# Patient Record
Sex: MALE | Race: WHITE | Employment: FULL TIME | URBAN - METROPOLITAN AREA
[De-identification: names, ages, dates, MRNs, and addresses within clinical notes are randomized per-mention and may not be internally consistent; named-entity substitution may affect disease eponyms.]

---

## 2024-08-02 ENCOUNTER — HOSPITAL ENCOUNTER (OUTPATIENT)
Dept: RADIOLOGY | Facility: EXTERNAL LOCATION | Age: 24
Discharge: HOME | End: 2024-08-02

## 2024-08-15 ENCOUNTER — APPOINTMENT (OUTPATIENT)
Dept: ORTHOPEDIC SURGERY | Facility: CLINIC | Age: 24
End: 2024-08-15
Payer: COMMERCIAL

## 2024-08-21 ENCOUNTER — LAB (OUTPATIENT)
Dept: LAB | Facility: LAB | Age: 24
End: 2024-08-21
Payer: COMMERCIAL

## 2024-08-21 ENCOUNTER — OFFICE VISIT (OUTPATIENT)
Dept: ORTHOPEDIC SURGERY | Facility: CLINIC | Age: 24
End: 2024-08-21
Payer: COMMERCIAL

## 2024-08-21 ENCOUNTER — HOSPITAL ENCOUNTER (OUTPATIENT)
Dept: RADIOLOGY | Facility: EXTERNAL LOCATION | Age: 24
Discharge: HOME | End: 2024-08-21

## 2024-08-21 VITALS — WEIGHT: 220 LBS | HEIGHT: 71 IN | BODY MASS INDEX: 30.8 KG/M2

## 2024-08-21 DIAGNOSIS — M46.1 SACROILIAC INFLAMMATION (CMS-HCC): Primary | ICD-10-CM

## 2024-08-21 DIAGNOSIS — M46.1 SACROILIAC INFLAMMATION (CMS-HCC): ICD-10-CM

## 2024-08-21 LAB
ALBUMIN SERPL BCP-MCNC: 4.6 G/DL (ref 3.4–5)
ALP SERPL-CCNC: 74 U/L (ref 33–120)
ALT SERPL W P-5'-P-CCNC: 25 U/L (ref 10–52)
ANION GAP SERPL CALC-SCNC: 15 MMOL/L (ref 10–20)
AST SERPL W P-5'-P-CCNC: 23 U/L (ref 9–39)
BASOPHILS # BLD AUTO: 0.01 X10*3/UL (ref 0–0.1)
BASOPHILS NFR BLD AUTO: 0.2 %
BILIRUB SERPL-MCNC: 1 MG/DL (ref 0–1.2)
BUN SERPL-MCNC: 19 MG/DL (ref 6–23)
CALCIUM SERPL-MCNC: 10.1 MG/DL (ref 8.6–10.3)
CCP IGG SERPL-ACNC: <1 U/ML
CHLORIDE SERPL-SCNC: 101 MMOL/L (ref 98–107)
CO2 SERPL-SCNC: 27 MMOL/L (ref 21–32)
CREAT SERPL-MCNC: 0.97 MG/DL (ref 0.5–1.3)
CRP SERPL-MCNC: 0.58 MG/DL
EGFRCR SERPLBLD CKD-EPI 2021: >90 ML/MIN/1.73M*2
EOSINOPHIL # BLD AUTO: 0.03 X10*3/UL (ref 0–0.7)
EOSINOPHIL NFR BLD AUTO: 0.5 %
ERYTHROCYTE [DISTWIDTH] IN BLOOD BY AUTOMATED COUNT: 12.2 % (ref 11.5–14.5)
ERYTHROCYTE [SEDIMENTATION RATE] IN BLOOD BY WESTERGREN METHOD: 10 MM/H (ref 0–15)
GLUCOSE SERPL-MCNC: 72 MG/DL (ref 74–99)
HCT VFR BLD AUTO: 43.9 % (ref 41–52)
HGB BLD-MCNC: 15.4 G/DL (ref 13.5–17.5)
IMM GRANULOCYTES # BLD AUTO: 0.01 X10*3/UL (ref 0–0.7)
IMM GRANULOCYTES NFR BLD AUTO: 0.2 % (ref 0–0.9)
LYMPHOCYTES # BLD AUTO: 1.2 X10*3/UL (ref 1.2–4.8)
LYMPHOCYTES NFR BLD AUTO: 20.5 %
MCH RBC QN AUTO: 31.4 PG (ref 26–34)
MCHC RBC AUTO-ENTMCNC: 35.1 G/DL (ref 32–36)
MCV RBC AUTO: 89 FL (ref 80–100)
MONOCYTES # BLD AUTO: 0.37 X10*3/UL (ref 0.1–1)
MONOCYTES NFR BLD AUTO: 6.3 %
NEUTROPHILS # BLD AUTO: 4.23 X10*3/UL (ref 1.2–7.7)
NEUTROPHILS NFR BLD AUTO: 72.3 %
NRBC BLD-RTO: 0 /100 WBCS (ref 0–0)
PLATELET # BLD AUTO: 270 X10*3/UL (ref 150–450)
POTASSIUM SERPL-SCNC: 4.4 MMOL/L (ref 3.5–5.3)
PROT SERPL-MCNC: 7.6 G/DL (ref 6.4–8.2)
RBC # BLD AUTO: 4.91 X10*6/UL (ref 4.5–5.9)
RHEUMATOID FACT SER NEPH-ACNC: <10 IU/ML (ref 0–15)
SODIUM SERPL-SCNC: 139 MMOL/L (ref 136–145)
WBC # BLD AUTO: 5.9 X10*3/UL (ref 4.4–11.3)

## 2024-08-21 PROCEDURE — 85613 RUSSELL VIPER VENOM DILUTED: CPT

## 2024-08-21 PROCEDURE — 86036 ANCA SCREEN EACH ANTIBODY: CPT

## 2024-08-21 PROCEDURE — 99214 OFFICE O/P EST MOD 30 MIN: CPT | Performed by: STUDENT IN AN ORGANIZED HEALTH CARE EDUCATION/TRAINING PROGRAM

## 2024-08-21 PROCEDURE — 99204 OFFICE O/P NEW MOD 45 MIN: CPT | Performed by: STUDENT IN AN ORGANIZED HEALTH CARE EDUCATION/TRAINING PROGRAM

## 2024-08-21 PROCEDURE — 81381 HLA I TYPING 1 ALLELE HR: CPT

## 2024-08-21 PROCEDURE — 36415 COLL VENOUS BLD VENIPUNCTURE: CPT

## 2024-08-21 PROCEDURE — 85652 RBC SED RATE AUTOMATED: CPT

## 2024-08-21 PROCEDURE — 80053 COMPREHEN METABOLIC PANEL: CPT

## 2024-08-21 PROCEDURE — 86431 RHEUMATOID FACTOR QUANT: CPT

## 2024-08-21 PROCEDURE — 1036F TOBACCO NON-USER: CPT | Performed by: STUDENT IN AN ORGANIZED HEALTH CARE EDUCATION/TRAINING PROGRAM

## 2024-08-21 PROCEDURE — 86140 C-REACTIVE PROTEIN: CPT

## 2024-08-21 PROCEDURE — 85730 THROMBOPLASTIN TIME PARTIAL: CPT

## 2024-08-21 PROCEDURE — 84078 ASSAY ALKALINE PHOSPHATASE: CPT

## 2024-08-21 PROCEDURE — 85025 COMPLETE CBC W/AUTO DIFF WBC: CPT

## 2024-08-21 PROCEDURE — 86200 CCP ANTIBODY: CPT

## 2024-08-21 RX ORDER — MELOXICAM 15 MG/1
15 TABLET ORAL DAILY
Qty: 30 TABLET | Refills: 3 | Status: SHIPPED | OUTPATIENT
Start: 2024-08-21 | End: 2024-12-19

## 2024-08-21 ASSESSMENT — ENCOUNTER SYMPTOMS
LOSS OF SENSATION IN FEET: 0
DEPRESSION: 0
OCCASIONAL FEELINGS OF UNSTEADINESS: 0

## 2024-08-21 ASSESSMENT — PATIENT HEALTH QUESTIONNAIRE - PHQ9
SUM OF ALL RESPONSES TO PHQ9 QUESTIONS 1 AND 2: 0
2. FEELING DOWN, DEPRESSED OR HOPELESS: NOT AT ALL
1. LITTLE INTEREST OR PLEASURE IN DOING THINGS: NOT AT ALL

## 2024-08-21 ASSESSMENT — PAIN - FUNCTIONAL ASSESSMENT: PAIN_FUNCTIONAL_ASSESSMENT: 0-10

## 2024-08-21 ASSESSMENT — PAIN SCALES - GENERAL: PAINLEVEL_OUTOF10: 2

## 2024-08-21 NOTE — PROGRESS NOTES
Orthopaedic Surgery  New Patient Clinic Note    Ean Leopold [id number] August 21, 2024    Reason for Consult: Right sided sacroiliitis    HPI: This is a pleasant 24-year-old male who is here for evaluation of his right sacroiliac joint.  He was seen by spine provider at [facility] Who then referred him to us after initial workup.  He states has been having pain since December which was exacerbated in the April to May timeframe.  It was so bad at some point he could hardly walk.  He takes daily ibuprofen.  States over the last couple months his pain is lessened but he still does a physical job and has daily pain which has not necessarily stopped him more recently.  He had an MRI of his lumbar spine as well as MRI of his pelvis with and without contrast which showed bony edema on both sides of the right sacroiliac joint concerning for sacroiliitis.  He was then sent for me for evaluation and second opinion regarding whether or not this could be tumor.    ROS:  15 point review of systems collected per intake sheet and negative except for as noted in HPI.    PMH: History reviewed. No pertinent past medical history. No history of DVT.     PSH:  History reviewed. No pertinent surgical history.     SHx: No smoking. No IVDU    Meds:   No current outpatient medications on file prior to visit.     No current facility-administered medications on file prior to visit.       PHYSICAL EXAM    GEN: AaOx4, NAD  HEENT: normocephalic atraumatic, EOMI, MMM, pupils equal and round  PSYCH: appropriate mood and affect  RESP: nonlabored breathing   CARDIAC: Extremities WWP, RRR to peripheral palpation  NEURO: CN 2-12 grossly intact  SKIN: Atraumatic    Physical exam of the right lower EXTR shows tenderness palpation of the sacroiliac joint.  He has no pain with axial loading.  He has excellent quadricep strength.  EHL, dorsiflexion plantarflexion are intact.  He has palpable pulses and cap refill state.  He has positive Jayson  testing.    Imaging:    MRI of the pelvis with and without contrast was obtained at the outside extrusion and reviewed by myself.  This shows slightly increased uptake on the postgadolinium imaging within the bone on both sides of the sacroiliac joint.  There is no nodular enhancing areas and this is more diffuse suggestive of bony edema      Assessment:    74-year-old male with right-sided sacroiliac joint sacroiliitis    Plan:    Discussed with the patient as well as his wife that from my standpoint I do not see anything discrete that would be definitively tumor and the fact that I see on both sides of a sacroiliac joint point more towards potentially inflammatory pathology.  I would recommend a laboratory workup which we have ordered today for inflammatory conditions.  If anything is elevated we may refer him to rheumatology for further evaluation.  If there is no significant elevation we may consider sending him for a sacroiliac joint injection which could be both diagnostic as well as therapeutic.  At minimum I do want to get a repeat MRI of his pelvis with and without contrast in 3 months to make sure there is no significant change compared to the one he got at the beginning of August.  Comparison of MRI from July to August shows no significant change on that short interval but I think a longer interval would be necessary see any definitive changes.  Will see what his laboratory workup shows to see whether or not he will get a referral to rheumatology versus IR for an injection.  He will then see me in 3 months for repeat MRI results.

## 2024-08-22 LAB
DRVVT SCREEN TO CONFIRM RATIO: 1.05 RATIO
DRVVT/DRVVT CFM NRMLZD PPP-RTO: 1.03 RATIO
DRVVT/DRVVT CFM P DOAC NEUT NORM PPP-RTO: 1.02 RATIO
LA 2 SCREEN W REFLEX-IMP: NORMAL
NORMALIZED SCT PPP-RTO: 0.9 RATIO
SILICA CLOTTING TIME CONFIRMATION: 1.03 RATIO
SILICA CLOTTING TIME SCREEN: 0.93 RATIO

## 2024-08-23 DIAGNOSIS — M46.1 SACROILIAC INFLAMMATION (CMS-HCC): Primary | ICD-10-CM

## 2024-08-23 LAB
ALP BONE SERPL-MCNC: 11.8 UG/L (ref 6.5–20.1)
HLAB27 TYPING: POSITIVE

## 2024-08-24 LAB
ANCA AB PATTERN SER IF-IMP: NORMAL
ANCA IGG TITR SER IF: NORMAL {TITER}

## 2024-08-26 ENCOUNTER — HOSPITAL ENCOUNTER (OUTPATIENT)
Dept: RADIOLOGY | Facility: EXTERNAL LOCATION | Age: 24
Discharge: HOME | End: 2024-08-26
Payer: COMMERCIAL

## 2024-09-25 ENCOUNTER — LAB (OUTPATIENT)
Dept: LAB | Facility: LAB | Age: 24
End: 2024-09-25
Payer: COMMERCIAL

## 2024-09-25 ENCOUNTER — APPOINTMENT (OUTPATIENT)
Dept: RHEUMATOLOGY | Facility: CLINIC | Age: 24
End: 2024-09-25
Payer: COMMERCIAL

## 2024-09-25 VITALS
DIASTOLIC BLOOD PRESSURE: 74 MMHG | TEMPERATURE: 98.4 F | WEIGHT: 216 LBS | HEIGHT: 71 IN | HEART RATE: 74 BPM | SYSTOLIC BLOOD PRESSURE: 133 MMHG | BODY MASS INDEX: 30.24 KG/M2

## 2024-09-25 DIAGNOSIS — M46.1 SACROILIAC INFLAMMATION (CMS-HCC): ICD-10-CM

## 2024-09-25 PROCEDURE — 87340 HEPATITIS B SURFACE AG IA: CPT

## 2024-09-25 PROCEDURE — 86803 HEPATITIS C AB TEST: CPT

## 2024-09-25 PROCEDURE — 99214 OFFICE O/P EST MOD 30 MIN: CPT | Performed by: INTERNAL MEDICINE

## 2024-09-25 PROCEDURE — 3008F BODY MASS INDEX DOCD: CPT | Performed by: INTERNAL MEDICINE

## 2024-09-25 PROCEDURE — 36415 COLL VENOUS BLD VENIPUNCTURE: CPT

## 2024-09-25 PROCEDURE — 86481 TB AG RESPONSE T-CELL SUSP: CPT

## 2024-09-25 PROCEDURE — 1036F TOBACCO NON-USER: CPT | Performed by: INTERNAL MEDICINE

## 2024-09-25 PROCEDURE — 86622 BRUCELLA ANTIBODY: CPT

## 2024-09-25 PROCEDURE — 86140 C-REACTIVE PROTEIN: CPT

## 2024-09-25 NOTE — PROGRESS NOTES
Subjective   Patient ID: [id number]   Ean Leopold is a 24 y.o. male who presents for Joint Pain.  HPI    Chronic eczema  Has been having right sided lower back pain around SI area and radiates to buttock for about 10 months  Started with intermittent pain but then could not walk one day  In April he could not get out bed at all  Morning stiffness for an hour   And moving around would exacerbate the pain  And resting would help  Was having nocturnal awakening.  No pain on left SI area  On NSAIDs regularly - ibuprfen initially for 600 mg BID  But now on meloxicam 15 mg once a day  That relieved 70% improvement Family history of Psoriasis in the sister  No dactylitis, enthesitis, uveitis, Psoriasis, IBD  No prior infections UTI/GI    - Physical work - service maintenance.  - non smoking, ocassional ETOH  - no contacts with TB  - no raw milk ingestion, not jeffrey.    ROS  Constitutional: Denies fever, chills, weight loss, night sweats or headaches  Eyes: Denies dry eyes, blurry vision, redness or pain or photophobia  ENT: Denies dry mouth, dental loss, loss of taste, nasal or oral ulcers, jaw claudication, difficulty swallowing, nasal crusting or recurrent sinus infections   Cardiovascular: Denies chest pain, palpitations, orthopnea  Respiratory: Denies shortness of breath, cough, asthma, or recurrent respiratory infections  Gastrointestinal: Denies dysphagia, nausea, vomiting, heartburn, abdominal pain, constipation, diarrhea, melena or hematochezia  Genitourinary: No recurrent urinary infections or STDs, no genital or anal ulcers.  Integumentary: Denies photosensitivity, rash or lesions, Raynaud's phenomenon, skin tightening, digital ulcers, psoriatic lesions, or alopecia  Neurological: Denies any numbness or tingling, muscle weakness, or incontinence   Hematologic/Lymphatic: Denies bleeding, bruising, history of clots (arterial or venous), or abortions/miscarriages/pregnancy complications   MSK: No joint pains,  redness, hotness or swelling. No inflammatory back pain, enthesitis, dactylitis. No morning stiffness   Muscular: Denies weakness, difficulty rising from chair or combing the hair, muscle aches, or problems with hand    FHx: No family history of autoimmune diseases       There is no problem list on file for this patient.       History reviewed. No pertinent past medical history.     History reviewed. No pertinent surgical history.     Social History     Socioeconomic History    Marital status:      Spouse name: Not on file    Number of children: Not on file    Years of education: Not on file    Highest education level: Not on file   Occupational History    Not on file   Tobacco Use    Smoking status: Never    Smokeless tobacco: Never   Vaping Use    Vaping status: Never Used   Substance and Sexual Activity    Alcohol use: Yes    Drug use: Never    Sexual activity: Yes   Other Topics Concern    Not on file   Social History Narrative    Not on file     Social Determinants of Health     Financial Resource Strain: Not on file   Food Insecurity: Not on file   Transportation Needs: Not on file   Physical Activity: Not on file   Stress: Not on file   Social Connections: Not on file   Intimate Partner Violence: Not on file   Housing Stability: Not on file        Allergies   Allergen Reactions    Nickel Rash          Current Outpatient Medications:     meloxicam (Mobic) 15 mg tablet, Take 1 tablet (15 mg) by mouth once daily., Disp: 30 tablet, Rfl: 3       Objective     Visit Vitals  /74   Pulse 74   Temp 36.9 °C (98.4 °F)        Physical Exam  Tender SI area  DIANA +ve on right side  Minor eczematous rash over the left axilla  Few acneiform lesions on the back    Rest of exam normal  General: AAOx3, Cooperative  Head: normocephalic, atraumatic  Eyes: EOMI, conjunctiva clear, sclera white, anicteric  Ears: no redness, swelling, tenderness  Nose: no deformity, no crusting   Throat/Mouth: No oral deformities,  no cheek swelling, mucosa appear moist, no oral ulcers noted or loss of dentition   Neck/Lymph: FROM, trachea midline  Lungs: chest expansion symmetric. No respiratory distress.   Heart: RRR  Neuro: CN II-XII grossly intact, no focal deficit  Skin: No rashes, ulcers or photosensitive areas  MSK: Upper Extremities:  Hand/Fingers: No erythema, swelling, tenderness or warmth at DIP, PIP, or MCP joints, FROM grossly. Good hand . No nodules. No deformities   Wrists: No erythema, swelling, warmth or tenderness at wrist, FROM grossly  Elbows: No tenderness, swelling, erythema or warmth at elbows, FROM grossly. No nodules   Shoulders: No swelling, erythema, tenderness or warmth at shoulders. FROM  Lower Extremities:   Hips: No obvious deformities. No joint tenderness, normal ROM grossly. Log roll test negative bilaterally. Jayson test is negative bilaterally. No trochanteric bursae TTP  Knees: No tenderness, deformities, swelling, rashes, or warmth, normal ROM grossly. No crepitus, no pes anserine bursa TTP   Ankles: No deformities, tenderness, edema, erythema, ulceration, or warmth at the ankle  Feet: Negative MTP squeeze. Normal ROM grossly.   Spine: No spinal tenderness to palpation. No SI joint tenderness. Gaenslen test negative       [unfilled]      Lab Results   Component Value Date    WBC 5.9 08/21/2024    HGB 15.4 08/21/2024    HCT 43.9 08/21/2024    MCV 89 08/21/2024     08/21/2024        Chemistry    Lab Results   Component Value Date/Time     08/21/2024 1403    K 4.4 08/21/2024 1403     08/21/2024 1403    CO2 27 08/21/2024 1403    BUN 19 08/21/2024 1403    CREATININE 0.97 08/21/2024 1403    Lab Results   Component Value Date/Time    CALCIUM 10.1 08/21/2024 1403    ALKPHOS 74 08/21/2024 1403    AST 23 08/21/2024 1403    ALT 25 08/21/2024 1403    BILITOT 1.0 08/21/2024 1403           Lab Results   Component Value Date    CRP 0.58 08/21/2024      Lab Results   Component Value Date    RF  "<10 08/21/2024    SEDRATE 10 08/21/2024      No results found for: \"CKTOTAL\"  Lab Results   Component Value Date    NEUTROABS 4.23 08/21/2024      No results found for: \"FERRITIN\"   No results found for: \"HEPATOT\", \"HEPAIGM\", \"HEPBCIGM\", \"HEPBCAB\", \"HBEAG\", \"HEPCAB\"   Lab Results   Component Value Date    ALT 25 08/21/2024    AST 23 08/21/2024    ALKPHOS 74 08/21/2024    BILITOT 1.0 08/21/2024      No results found for: \"PPD\"   No results found for: \"URICACID\"   Lab Results   Component Value Date    CALCIUM 10.1 08/21/2024      No results found for: \"SPEP\", \"UPEP\"   No results found for: \"ALBUR\", \"TCM18NDY\"   .last          MR transfer of outside films  Outside images for comparison or treatment purposes, not interpreted by    Radiologists.     === 08/20/24 ===    X-RAY - ONBASE SCAN   === 08/26/24 ===    MR TRANSFER OF OUTSIDE FILMS          Assessment/Plan   Diagnoses and all orders for this visit:  Sacroiliac inflammation (CMS-HCC)  Chronic lower back pain, HLA B27 positive status but only unilateral Rt sided sacroiliitis on MRI in June 2024 and July 2024 dedicated MRI of pelvis  Family history of Psoriasis +ve  Symptoms also localized to right SI area  The presentation is atypical for axSpA ( possibly an early manifestation)  No symptoms of IBD; no features of SAPHO for now  Will have to ensure there is no element of chronic atypical infections like TB or Brucella  Has no risk factors by history at least  And  also no contiguous spread of inflammation to adjacent structures on the MRI done in June 2024  Has had good response to regular NSAID x 1 mo   Will continue on NSAIDs for now since he had good response  QFT, Brucella serologies, CRP  Pre biologic screening for now  Referring to PT  Follow up x 3 months  -     Referral to Rheumatology  -     T-Spot TB; Future  -     Brucella Antibodies IgG, IgM; Future  -     C-Reactive Protein; Future  -     Referral to Physical Therapy; Future  -     Hepatitis C " Antibody; Future  -     Hepatitis B Surface Antigen; Future         MD Jamie Erickson MD   of Medicine  [facility] - Department of Rheumatology  [facility]   Plan, including risks and benefits, was discussed with the patient, informed on how to reach us.     To schedule an appointment, call  [phone] Liane or call [phone] for [facility]

## 2024-09-25 NOTE — PATIENT INSTRUCTIONS
"I am ordering some blood tests to rule out some infection exposure    The MRI is suggestive of a condition called \"Axial Spondyloarthritis\"    Contnue on NSAIDS ( meloxicam ) for now    And I am referring you to physical therapy      If the tests for infection come back positive, I will let you know  Follow up in 3 months or earlier if needed    "

## 2024-09-26 LAB
CRP SERPL-MCNC: 0.5 MG/DL
HBV SURFACE AG SERPL QL IA: NONREACTIVE
HCV AB SER QL: NONREACTIVE

## 2024-09-28 LAB
NIL(NEG) CONTROL SPOT COUNT: NORMAL
PANEL A SPOT COUNT: 0
PANEL B SPOT COUNT: 0
POS CONTROL SPOT COUNT: NORMAL
T-SPOT. TB INTERPRETATION: NEGATIVE

## 2024-10-01 LAB
BRUCELLA IGG SER QL IA: NEGATIVE
BRUCELLA IGG+IGM SER-IMP: NORMAL
BRUCELLA IGM SER QL IA: NEGATIVE

## 2024-11-21 ENCOUNTER — HOSPITAL ENCOUNTER (OUTPATIENT)
Dept: RADIOLOGY | Facility: CLINIC | Age: 24
Discharge: HOME | End: 2024-11-21
Payer: COMMERCIAL

## 2024-11-21 DIAGNOSIS — M46.1 SACROILIAC INFLAMMATION (CMS-HCC): ICD-10-CM

## 2024-11-21 PROCEDURE — 72197 MRI PELVIS W/O & W/DYE: CPT | Performed by: RADIOLOGY

## 2024-11-21 PROCEDURE — 72197 MRI PELVIS W/O & W/DYE: CPT

## 2024-11-21 PROCEDURE — A9575 INJ GADOTERATE MEGLUMI 0.1ML: HCPCS | Performed by: STUDENT IN AN ORGANIZED HEALTH CARE EDUCATION/TRAINING PROGRAM

## 2024-11-21 PROCEDURE — 2550000001 HC RX 255 CONTRASTS: Performed by: STUDENT IN AN ORGANIZED HEALTH CARE EDUCATION/TRAINING PROGRAM

## 2024-11-21 RX ORDER — GADOTERATE MEGLUMINE 376.9 MG/ML
0.2 INJECTION INTRAVENOUS
Status: COMPLETED | OUTPATIENT
Start: 2024-11-21 | End: 2024-11-21

## 2024-11-22 ENCOUNTER — TELEMEDICINE (OUTPATIENT)
Dept: ORTHOPEDIC SURGERY | Facility: HOSPITAL | Age: 24
End: 2024-11-22
Payer: COMMERCIAL

## 2024-11-22 VITALS — BODY MASS INDEX: 30.24 KG/M2 | HEIGHT: 71 IN | WEIGHT: 216 LBS

## 2024-11-22 DIAGNOSIS — M46.1 SACROILIAC INFLAMMATION (CMS-HCC): ICD-10-CM

## 2024-11-22 PROCEDURE — 1036F TOBACCO NON-USER: CPT | Performed by: STUDENT IN AN ORGANIZED HEALTH CARE EDUCATION/TRAINING PROGRAM

## 2024-11-22 PROCEDURE — 3008F BODY MASS INDEX DOCD: CPT | Performed by: STUDENT IN AN ORGANIZED HEALTH CARE EDUCATION/TRAINING PROGRAM

## 2024-11-22 PROCEDURE — 99213 OFFICE O/P EST LOW 20 MIN: CPT | Performed by: STUDENT IN AN ORGANIZED HEALTH CARE EDUCATION/TRAINING PROGRAM

## 2024-11-22 RX ORDER — TRAMADOL HYDROCHLORIDE 50 MG/1
50 TABLET ORAL EVERY 6 HOURS PRN
COMMUNITY
Start: 2024-07-02

## 2024-11-22 ASSESSMENT — ENCOUNTER SYMPTOMS
OCCASIONAL FEELINGS OF UNSTEADINESS: 0
DEPRESSION: 0
LOSS OF SENSATION IN FEET: 0

## 2024-11-22 ASSESSMENT — PATIENT HEALTH QUESTIONNAIRE - PHQ9
SUM OF ALL RESPONSES TO PHQ9 QUESTIONS 1 AND 2: 0
1. LITTLE INTEREST OR PLEASURE IN DOING THINGS: NOT AT ALL
2. FEELING DOWN, DEPRESSED OR HOPELESS: NOT AT ALL

## 2024-11-22 ASSESSMENT — PAIN - FUNCTIONAL ASSESSMENT: PAIN_FUNCTIONAL_ASSESSMENT: NO/DENIES PAIN

## 2024-11-22 NOTE — PROGRESS NOTES
Orthopaedic Surgery Clinic Progress Note:    CC: MRI follow-up    S: 24 y.o. male with history of right sided sacroiliac joint pain as well as MRI findings consistent with potentially sacroiliitis although there was concern of neoplastic process.  For that reason he saw us and we ordered laboratory workup as well as a repeat MRI to document resolution of the inflammation.  He did have some findings on his laboratory work and for that reason he went and saw rheumatology as well.  Is been taking meloxicam and states that his pain is much better on the meloxicam.  He is also overall feeling much better than when he saw us last time.  He did recently get an MRI to document stability of the region and is here to go over those results.    Objective:    Exam:  Gen: alert, appropriately conversational, no apparent distress    Physical exam is unable to be performed due to the virtual nature of the visit the patient is overall well-appearing without apparent distress    Imaging:  MRI which was obtained recently and individually reviewed by myself demonstrates near complete resolution of the inflammatory response seen in the sacrum as well as ilium.  The sacroiliitis and inflammation appears to be much improved compared to previous MRI from August 2024.  No nodular enhancing areas to suggest a neoplastic process.      A/P:    Patient is doing very well and much better on the anti-inflammatories.  He is already keyed in with rheumatology and for my standpoint given the resolution of the sacroiliitis seen on his MRI compared to previous I do not think he needs any further surveillance or monitoring.  From my standpoint he can see me back on an as-needed basis moving forward.

## 2024-11-22 NOTE — LETTER
November 22, 2024     Patient: Ean Leopold   YOB: 2000   Date of Visit: 11/22/2024       To Whom It May Concern:    Ean Leopold was seen in my clinic on 11/22/2024 at 1:30 pm. Please excuse Sal for his absence from work on this day to make the appointment.    If you have any questions or concerns, please don't hesitate to call.         Sincerely,         Konstantin Castanon MD        CC: No Recipients

## 2024-12-18 ENCOUNTER — APPOINTMENT (OUTPATIENT)
Dept: RHEUMATOLOGY | Facility: CLINIC | Age: 24
End: 2024-12-18
Payer: COMMERCIAL

## 2024-12-18 VITALS
TEMPERATURE: 98.2 F | HEART RATE: 55 BPM | HEIGHT: 71 IN | DIASTOLIC BLOOD PRESSURE: 73 MMHG | SYSTOLIC BLOOD PRESSURE: 136 MMHG | BODY MASS INDEX: 32.9 KG/M2 | WEIGHT: 235 LBS

## 2024-12-18 DIAGNOSIS — M46.90 AXIAL SPONDYLOARTHRITIS (CMS-HCC): Primary | ICD-10-CM

## 2024-12-18 PROCEDURE — 99214 OFFICE O/P EST MOD 30 MIN: CPT | Performed by: INTERNAL MEDICINE

## 2024-12-18 PROCEDURE — 1036F TOBACCO NON-USER: CPT | Performed by: INTERNAL MEDICINE

## 2024-12-18 PROCEDURE — 3008F BODY MASS INDEX DOCD: CPT | Performed by: INTERNAL MEDICINE

## 2024-12-18 NOTE — PROGRESS NOTES
Subjective   Patient ID: [id number]   Ean Leopold is a 24 y.o. male who presents for Arthritis.    Diagnosis - Axial SpA  HLA B27 +ve    Current Rx -   on Meloxicam x 3 mo    RECALL  Chronic eczema  Has been having right sided lower back pain around SI area and radiates to buttock for about 10 months  Started with intermittent pain but then could not walk one day  In April he could not get out bed at all  Morning stiffness for an hour   And moving around would exacerbate the pain  And resting would help  Was having nocturnal awakening.  No pain on left SI area  On NSAIDs regularly - ibuprfen initially for 600 mg BID  But now on meloxicam 15 mg once a day  That relieved 70% improvement Family history of Psoriasis in the sister  No dactylitis, enthesitis, uveitis, Psoriasis, IBD  No prior infections UTI/GI  - Physical work - service maintenance.  - non smoking, ocassional ETOH  - no contacts with TB  - no raw milk ingestion, not farmer.    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 No pertinent past medical history.     History reviewed. No pertinent surgical history.     Social History     Socioeconomic History    Marital status:      Spouse name: Not on file    Number of children: Not on file    Years of education: Not on file    Highest education level: Not on file   Occupational History    Not on file   Tobacco Use    Smoking status: Never    Smokeless tobacco: Never   Vaping Use    Vaping status: Never Used   Substance and Sexual Activity    Alcohol use: Yes    Drug use: Never    Sexual activity: Yes   Other Topics Concern    Not on file   Social History Narrative    Not on file     Social Drivers of Health     Financial Resource Strain: Not on file    Food Insecurity: Not on file   Transportation Needs: Not on file   Physical Activity: Not on file   Stress: Not on file   Social Connections: Not on file   Intimate Partner Violence: Not on file   Housing Stability: Not on file        Allergies   Allergen Reactions    Nickel Rash          Current Outpatient Medications:     meloxicam (Mobic) 15 mg tablet, Take 1 tablet (15 mg) by mouth once daily., Disp: 30 tablet, Rfl: 3       Objective     Visit Vitals  /73   Pulse 55   Temp 36.8 °C (98.2 °F)        Physical Exam    JAYSON +ve on right side prev ; today normal exam  Minor eczematous rash over the left axilla  Few acneiform lesions on the back    Rest of exam normal  General: AAOx3, Cooperative  Head: normocephalic, atraumatic  Eyes: EOMI, conjunctiva clear, sclera white, anicteric  Ears: no redness, swelling, tenderness  Nose: no deformity, no crusting   Throat/Mouth: No oral deformities, no cheek swelling, mucosa appear moist, no oral ulcers noted or loss of dentition   Neck/Lymph: FROM, trachea midline  Lungs: chest expansion symmetric. No respiratory distress.   Heart: RRR  Neuro: CN II-XII grossly intact, no focal deficit  Skin: No rashes, ulcers or photosensitive areas  MSK: Upper Extremities:  Hand/Fingers: No erythema, swelling, tenderness or warmth at DIP, PIP, or MCP joints, FROM grossly. Good hand . No nodules. No deformities   Wrists: No erythema, swelling, warmth or tenderness at wrist, FROM grossly  Elbows: No tenderness, swelling, erythema or warmth at elbows, FROM grossly. No nodules   Shoulders: No swelling, erythema, tenderness or warmth at shoulders. FROM  Lower Extremities:   Hips: No obvious deformities. No joint tenderness, normal ROM grossly. Log roll test negative bilaterally. Jayson test is negative bilaterally. No trochanteric bursae TTP  Knees: No tenderness, deformities, swelling, rashes, or warmth, normal ROM grossly. No crepitus, no pes anserine bursa TTP   Ankles: No  "deformities, tenderness, edema, erythema, ulceration, or warmth at the ankle  Feet: Negative MTP squeeze. Normal ROM grossly.   Spine: No spinal tenderness to palpation. No SI joint tenderness. Gaenslen test negative       [unfilled]      Lab Results   Component Value Date    WBC 5.9 08/21/2024    HGB 15.4 08/21/2024    HCT 43.9 08/21/2024    MCV 89 08/21/2024     08/21/2024        Chemistry    Lab Results   Component Value Date/Time     08/21/2024 1403    K 4.4 08/21/2024 1403     08/21/2024 1403    CO2 27 08/21/2024 1403    BUN 19 08/21/2024 1403    CREATININE 0.97 08/21/2024 1403    Lab Results   Component Value Date/Time    CALCIUM 10.1 08/21/2024 1403    ALKPHOS 74 08/21/2024 1403    AST 23 08/21/2024 1403    ALT 25 08/21/2024 1403    BILITOT 1.0 08/21/2024 1403           Lab Results   Component Value Date    CRP 0.50 09/25/2024      Lab Results   Component Value Date    RF <10 08/21/2024    SEDRATE 10 08/21/2024      No results found for: \"CKTOTAL\"  Lab Results   Component Value Date    NEUTROABS 4.23 08/21/2024      No results found for: \"FERRITIN\"   Lab Results   Component Value Date    HEPCAB Nonreactive 09/25/2024      Lab Results   Component Value Date    ALT 25 08/21/2024    AST 23 08/21/2024    ALKPHOS 74 08/21/2024    BILITOT 1.0 08/21/2024      No results found for: \"PPD\"   No results found for: \"URICACID\"   Lab Results   Component Value Date    CALCIUM 10.1 08/21/2024      No results found for: \"SPEP\", \"UPEP\"   No results found for: \"ALBUR\", \"[id number]\"   .last          MR pelvis w and wo IV contrast  Narrative: Interpreted By:  Lee Polk,   STUDY:  MR PELVIS W AND WO IV CONTRAST;      INDICATION:  Signs/Symptoms:SIJ.      COMPARISON:  Previous MRI July 22, 2024      ACCESSION NUMBER(S):  [id number]      ORDERING CLINICIAN:  ARRON CALLOWAY      TECHNIQUE:  Routine multiplanar multisequential MRI of the pelvis performed  before and after the uneventful administration of " intravenous  gadolinium contrast.      FINDINGS:  In the interval from the previous examination of the degree of marrow  edema at both sides of the right sacroiliac joint has significantly  improved. A small amount of marrow edema remains. There is also a  small amount of enhancement within the right sacroiliac joint. Some  subtle loss of cortical definition particularly anteriorly.      Presacral soft tissues are normal.      No adjacent muscular signal abnormality.      No focal fluid collections.      Contralateral left sacroiliac joint normal.      No other areas of osseous signal abnormality.      Cam morphology femoral necks likely.      No sizeable effusions of the hip joints.      No evidence of avascular necrosis.              Impression: Right-sided sacroiliitis with some marrow edema and subtle erosive  change.. The degree of inflammatory change and marrow edema has  markedly improved from the prior examination with a small amount of  residual. Differential diagnosis includes resolving septic arthritis  given the completely unilateral nature. Also a seronegative  spondyloarthropathy a consideration.      Signed by: Lee Polk 11/21/2024 5:23 PM  Dictation workstation:   [id number]     === 08/20/24 ===    X-RAY - ONBASE SCAN   === 08/26/24 ===    MR TRANSFER OF OUTSIDE FILMS          Assessment/Plan   Diagnoses and all orders for this visit:  Axial Spondyloarthritis   HLA B27 positive status   only unilateral Rt sided sacroiliitis on MRI in June 2024 and July 2024 dedicated MRI of pelvis ( early presentation)  MRI+/CRP -ve  Family history of Psoriasis +ve  No symptoms of IBD; no features of SAPHO for now  And  also no contiguous spread of inflammation to adjacent structures on the MRI done in June 2024 arguing against infectious causes  QFT, Brucella serologies were negative as well  He had a repeat MRI performed by ortho colleagues and it shows reduced BME on STIR sequences    Has had good response to  regular NSAIDs  Advised to do PT = resources provideded  Will continue on NSAIDs for now since he had good response    Patient was advised if NSAIDs stop working in future, we will proceed with bDMARDS  No extra MSK features of AxSpA currently.    Follow up x 3 months         MD Jamie Erickson MD   of Medicine  [facility] - Department of Rheumatology  [facility]   Plan, including risks and benefits, was discussed with the patient, informed on how to reach us.     To schedule an appointment, call  [phone] Liane or call [phone] for [facility]

## 2024-12-18 NOTE — PATIENT INSTRUCTIONS
https://[email or web address].[email or web address]-[email or web address]-[email or web address]    Continue on meloxicam for now after meals    Please do exercises at home     Follow up x 3-4 months

## 2024-12-23 DIAGNOSIS — M46.1 SACROILIAC INFLAMMATION (CMS-HCC): ICD-10-CM

## 2024-12-23 RX ORDER — MELOXICAM 15 MG/1
15 TABLET ORAL DAILY
Qty: 90 TABLET | Refills: 1 | Status: SHIPPED | OUTPATIENT
Start: 2024-12-23 | End: 2025-06-21

## 2024-12-23 NOTE — TELEPHONE ENCOUNTER
Received a fax from [facility] in [place] requesting a refill on Meloxicam. Refill has been placed for 90 days with 1 refill via [facility] and sent to Dr. Castanon for approval.

## 2025-03-19 ENCOUNTER — APPOINTMENT (OUTPATIENT)
Facility: CLINIC | Age: 25
End: 2025-03-19
Payer: COMMERCIAL

## 2025-04-30 ENCOUNTER — APPOINTMENT (OUTPATIENT)
Facility: CLINIC | Age: 25
End: 2025-04-30
Payer: COMMERCIAL

## 2025-04-30 VITALS
HEART RATE: 59 BPM | WEIGHT: 222 LBS | HEIGHT: 70 IN | DIASTOLIC BLOOD PRESSURE: 81 MMHG | TEMPERATURE: 97.2 F | SYSTOLIC BLOOD PRESSURE: 131 MMHG | BODY MASS INDEX: 31.78 KG/M2

## 2025-04-30 DIAGNOSIS — M46.90 AXIAL SPONDYLOARTHRITIS: Primary | ICD-10-CM

## 2025-04-30 DIAGNOSIS — M46.1 SACROILIAC INFLAMMATION: ICD-10-CM

## 2025-04-30 PROCEDURE — 99214 OFFICE O/P EST MOD 30 MIN: CPT | Performed by: INTERNAL MEDICINE

## 2025-04-30 PROCEDURE — 3008F BODY MASS INDEX DOCD: CPT | Performed by: INTERNAL MEDICINE

## 2025-04-30 RX ORDER — MELOXICAM 7.5 MG/1
7.5 TABLET ORAL DAILY
Qty: 90 TABLET | Refills: 1 | Status: SHIPPED | OUTPATIENT
Start: 2025-04-30 | End: 2025-10-27

## 2025-04-30 NOTE — PROGRESS NOTES
Subjective   Patient ID: [id number]   Ean Leopold is a 25 y.o. male who presents for Follow-up.    Diagnosis -   Axial SpA  HLA B27 +ve    Current Rx -  on Meloxicam x 6 mo    Interval history 5.1.2025  Doing better,   occasional minimal stiffness in the back no nocturnal symptoms  Back to his baseline  More than 90-95% relief with NSAIDs  Denies enthesitis, dactylitis and has no No extra MSK features     RECALL  Chronic eczema  Has been having right sided lower back pain around SI area and radiates to buttock for about 10 months  Started with intermittent pain but then could not walk one day  In April he could not get out bed at all  Morning stiffness for an hour   And moving around would exacerbate the pain  And resting would help  Was having nocturnal awakening.  No pain on left SI area  On NSAIDs regularly - ibuprfen initially for 600 mg BID  But now on meloxicam 15 mg once a day  That relieved 70% improvement Family history of Psoriasis in the sister  No dactylitis, enthesitis, uveitis, Psoriasis, IBD  No prior infections UTI/GI  - Physical work - service maintenance.  - non smoking, ocassional ETOH  - no contacts with TB  - no raw milk ingestion, not farmer.        Patient Active Problem List   Diagnosis    Axial spondyloarthritis        History reviewed. No pertinent past medical history.     History reviewed. No pertinent surgical history.     Social History     Socioeconomic History    Marital status:      Spouse name: Not on file    Number of children: Not on file    Years of education: Not on file    Highest education level: Not on file   Occupational History    Not on file   Tobacco Use    Smoking status: Never    Smokeless tobacco: Never   Vaping Use    Vaping status: Never Used   Substance and Sexual Activity    Alcohol use: Yes    Drug use: Never    Sexual activity: Yes   Other Topics Concern    Not on file   Social History Narrative    Not on file     Social Drivers of Health     Financial  Resource Strain: Not on file   Food Insecurity: Not on file   Transportation Needs: Not on file   Physical Activity: Not on file   Stress: Not on file   Social Connections: Not on file   Intimate Partner Violence: Not on file   Housing Stability: Not on file        Allergies   Allergen Reactions    Nickel Rash          Current Outpatient Medications:     meloxicam (Mobic) 15 mg tablet, Take 1 tablet (15 mg) by mouth once daily., Disp: 90 tablet, Rfl: 1       Objective     Visit Vitals  /81   Pulse 59   Temp 36.2 °C (97.2 °F)        Physical Exam  JAYSON +ve on right side prev ; today normal exam  Minor eczematous rash over the left axilla  Few acneiform lesions on the back    Rest of exam normal  General: AAOx3, Cooperative  Head: normocephalic, atraumatic  Eyes: EOMI, conjunctiva clear, sclera white, anicteric  Ears: no redness, swelling, tenderness  Nose: no deformity, no crusting   Throat/Mouth: No oral deformities, no cheek swelling, mucosa appear moist, no oral ulcers noted or loss of dentition   Neck/Lymph: FROM, trachea midline  Lungs: chest expansion symmetric. No respiratory distress.   Heart: RRR  Neuro: CN II-XII grossly intact, no focal deficit  Skin: No rashes, ulcers or photosensitive areas  MSK: Upper Extremities:  Hand/Fingers: No erythema, swelling, tenderness or warmth at DIP, PIP, or MCP joints, FROM grossly. Good hand . No nodules. No deformities   Wrists: No erythema, swelling, warmth or tenderness at wrist, FROM grossly  Elbows: No tenderness, swelling, erythema or warmth at elbows, FROM grossly. No nodules   Shoulders: No swelling, erythema, tenderness or warmth at shoulders. FROM  Lower Extremities:   Hips: No obvious deformities. No joint tenderness, normal ROM grossly. Log roll test negative bilaterally. Jayson test is negative bilaterally. No trochanteric bursae TTP  Knees: No tenderness, deformities, swelling, rashes, or warmth, normal ROM grossly. No crepitus, no pes anserine  "bursa TTP   Ankles: No deformities, tenderness, edema, erythema, ulceration, or warmth at the ankle  Feet: Negative MTP squeeze. Normal ROM grossly.   Spine: No spinal tenderness to palpation. No SI joint tenderness. Gaenslen test negative       [unfilled]      Lab Results   Component Value Date    WBC 5.9 08/21/2024    HGB 15.4 08/21/2024    HCT 43.9 08/21/2024    MCV 89 08/21/2024     08/21/2024        Chemistry    Lab Results   Component Value Date/Time     08/21/2024 1403    K 4.4 08/21/2024 1403     08/21/2024 1403    CO2 27 08/21/2024 1403    BUN 19 08/21/2024 1403    CREATININE 0.97 08/21/2024 1403    Lab Results   Component Value Date/Time    CALCIUM 10.1 08/21/2024 1403    ALKPHOS 74 08/21/2024 1403    AST 23 08/21/2024 1403    ALT 25 08/21/2024 1403    BILITOT 1.0 08/21/2024 1403           Lab Results   Component Value Date    CRP 0.50 09/25/2024      Lab Results   Component Value Date    RF <10 08/21/2024    SEDRATE 10 08/21/2024      No results found for: \"CKTOTAL\"  Lab Results   Component Value Date    NEUTROABS 4.23 08/21/2024      No results found for: \"FERRITIN\"   Lab Results   Component Value Date    HEPCAB Nonreactive 09/25/2024      Lab Results   Component Value Date    ALT 25 08/21/2024    AST 23 08/21/2024    ALKPHOS 74 08/21/2024    BILITOT 1.0 08/21/2024      No results found for: \"PPD\"   No results found for: \"URICACID\"   Lab Results   Component Value Date    CALCIUM 10.1 08/21/2024      No results found for: \"SPEP\", \"UPEP\"   No results found for: \"ALBUR\", \"[id number]\"   .last          MR pelvis w and wo IV contrast  Narrative: Interpreted By:  Lee Polk,   STUDY:  MR PELVIS W AND WO IV CONTRAST;      INDICATION:  Signs/Symptoms:SIJ.      COMPARISON:  Previous MRI July 22, 2024      ACCESSION NUMBER(S):  [id number]      ORDERING CLINICIAN:  ARRON CALLOWAY      TECHNIQUE:  Routine multiplanar multisequential MRI of the pelvis performed  before and after the " uneventful administration of intravenous  gadolinium contrast.      FINDINGS:  In the interval from the previous examination of the degree of marrow  edema at both sides of the right sacroiliac joint has significantly  improved. A small amount of marrow edema remains. There is also a  small amount of enhancement within the right sacroiliac joint. Some  subtle loss of cortical definition particularly anteriorly.      Presacral soft tissues are normal.      No adjacent muscular signal abnormality.      No focal fluid collections.      Contralateral left sacroiliac joint normal.      No other areas of osseous signal abnormality.      Cam morphology femoral necks likely.      No sizeable effusions of the hip joints.      No evidence of avascular necrosis.              Impression: Right-sided sacroiliitis with some marrow edema and subtle erosive  change.. The degree of inflammatory change and marrow edema has  markedly improved from the prior examination with a small amount of  residual. Differential diagnosis includes resolving septic arthritis  given the completely unilateral nature. Also a seronegative  spondyloarthropathy a consideration.      Signed by: Lee Polk 11/21/2024 5:23 PM  Dictation workstation:   [id number]     === 08/20/24 ===    X-RAY - ONBASE SCAN   === 08/26/24 ===    MR TRANSFER OF OUTSIDE FILMS          Assessment/Plan   Diagnoses and all orders for this visit:  Axial Spondyloarthritis  HLA B27 positive status   only unilateral Rt sided sacroiliitis on MRI in June 2024 and July 2024 dedicated MRI of pelvis ( early presentation)  MRI+/CRP -ve  Family history of Psoriasis +ve  No symptoms of IBD; no features of SAPHO for now  And  also no contiguous spread of inflammation to adjacent structures on the MRI done in June 2024 arguing against infectious causes  QFT, Brucella serologies were negative as well  He had a repeat MRI performed by ortho colleagues and it shows reduced BME on STIR  sequences    Has had good response to regular NSAIDs    BASDAI 1 today    Advised to continue PT = resources provideded  Decrease Melixocam to 7.5 mg daily  Side effects of long term use of NSAIDs explaiend to patient  Surveillance labs ordered    Follow up x 4-6 months         Jamie Howard MD FACR     of Medicine  [facility] - Department of Rheumatology  [facility]   Plan, including risks and benefits, was discussed with the patient, informed on how to reach us.     To schedule an appointment, call  [phone] Liane or call [phone] for [facility]

## 2025-04-30 NOTE — PATIENT INSTRUCTIONS
Reducing the dose of meloxicam to 7.5 mg daily  Take it after meals    Continue back exercises at home    Please do the blood tests today    Follow up x 6 months

## 2025-05-01 LAB
ALBUMIN SERPL-MCNC: 4.9 G/DL (ref 3.6–5.1)
ALP SERPL-CCNC: 64 U/L (ref 36–130)
ALT SERPL-CCNC: 19 U/L (ref 9–46)
ANION GAP SERPL CALCULATED.4IONS-SCNC: 10 MMOL/L (CALC) (ref 7–17)
AST SERPL-CCNC: 20 U/L (ref 10–40)
BASOPHILS # BLD AUTO: 31 CELLS/UL (ref 0–200)
BASOPHILS NFR BLD AUTO: 0.6 %
BILIRUB SERPL-MCNC: 0.7 MG/DL (ref 0.2–1.2)
BUN SERPL-MCNC: 30 MG/DL (ref 7–25)
CALCIUM SERPL-MCNC: 9.5 MG/DL (ref 8.6–10.3)
CHLORIDE SERPL-SCNC: 103 MMOL/L (ref 98–110)
CO2 SERPL-SCNC: 26 MMOL/L (ref 20–32)
CREAT SERPL-MCNC: 0.9 MG/DL (ref 0.6–1.24)
CRP SERPL-MCNC: 3.5 MG/L
EGFRCR SERPLBLD CKD-EPI 2021: 120 ML/MIN/1.73M2
EOSINOPHIL # BLD AUTO: 52 CELLS/UL (ref 15–500)
EOSINOPHIL NFR BLD AUTO: 1 %
ERYTHROCYTE [DISTWIDTH] IN BLOOD BY AUTOMATED COUNT: 12.3 % (ref 11–15)
GLUCOSE SERPL-MCNC: 89 MG/DL (ref 65–99)
HCT VFR BLD AUTO: 44.9 % (ref 38.5–50)
HGB BLD-MCNC: 15.5 G/DL (ref 13.2–17.1)
LYMPHOCYTES # BLD AUTO: 1425 CELLS/UL (ref 850–3900)
LYMPHOCYTES NFR BLD AUTO: 27.4 %
MCH RBC QN AUTO: 31.1 PG (ref 27–33)
MCHC RBC AUTO-ENTMCNC: 34.5 G/DL (ref 32–36)
MCV RBC AUTO: 90.2 FL (ref 80–100)
MONOCYTES # BLD AUTO: 447 CELLS/UL (ref 200–950)
MONOCYTES NFR BLD AUTO: 8.6 %
NEUTROPHILS # BLD AUTO: 3245 CELLS/UL (ref 1500–7800)
NEUTROPHILS NFR BLD AUTO: 62.4 %
PLATELET # BLD AUTO: 242 THOUSAND/UL (ref 140–400)
PMV BLD REES-ECKER: 8.9 FL (ref 7.5–12.5)
POTASSIUM SERPL-SCNC: 4.3 MMOL/L (ref 3.5–5.3)
PROT SERPL-MCNC: 7.3 G/DL (ref 6.1–8.1)
RBC # BLD AUTO: 4.98 MILLION/UL (ref 4.2–5.8)
SODIUM SERPL-SCNC: 139 MMOL/L (ref 135–146)
WBC # BLD AUTO: 5.2 THOUSAND/UL (ref 3.8–10.8)

## 2025-08-28 ENCOUNTER — PATIENT MESSAGE (OUTPATIENT)
Facility: CLINIC | Age: 25
End: 2025-08-28
Payer: COMMERCIAL

## 2025-08-28 DIAGNOSIS — M46.90 AXIAL SPONDYLOARTHRITIS: ICD-10-CM

## 2025-08-28 DIAGNOSIS — M46.1 SACROILIAC INFLAMMATION: ICD-10-CM

## 2025-08-29 DIAGNOSIS — M46.1 SACROILIAC INFLAMMATION: ICD-10-CM

## 2025-08-29 DIAGNOSIS — M46.90 AXIAL SPONDYLOARTHRITIS: ICD-10-CM

## 2025-08-29 RX ORDER — MELOXICAM 7.5 MG/1
7.5 TABLET ORAL DAILY
Qty: 90 TABLET | Refills: 1 | Status: SHIPPED | OUTPATIENT
Start: 2025-08-29 | End: 2026-02-25

## 2025-08-29 RX ORDER — MELOXICAM 7.5 MG/1
7.5 TABLET ORAL DAILY
Qty: 90 TABLET | Refills: 1 | OUTPATIENT
Start: 2025-08-29 | End: 2026-02-25

## 2025-11-05 ENCOUNTER — APPOINTMENT (OUTPATIENT)
Facility: CLINIC | Age: 25
End: 2025-11-05
Payer: COMMERCIAL